# Patient Record
Sex: MALE | Race: BLACK OR AFRICAN AMERICAN | Employment: OTHER | URBAN - METROPOLITAN AREA
[De-identification: names, ages, dates, MRNs, and addresses within clinical notes are randomized per-mention and may not be internally consistent; named-entity substitution may affect disease eponyms.]

---

## 2019-05-23 ENCOUNTER — OFFICE VISIT (OUTPATIENT)
Dept: FAMILY MEDICINE CLINIC | Age: 64
End: 2019-05-23

## 2019-05-23 VITALS
TEMPERATURE: 98.5 F | SYSTOLIC BLOOD PRESSURE: 114 MMHG | HEART RATE: 75 BPM | OXYGEN SATURATION: 99 % | BODY MASS INDEX: 21.73 KG/M2 | HEIGHT: 77 IN | RESPIRATION RATE: 15 BRPM | WEIGHT: 184 LBS | DIASTOLIC BLOOD PRESSURE: 75 MMHG

## 2019-05-23 DIAGNOSIS — R50.9 FEVER AND CHILLS: ICD-10-CM

## 2019-05-23 DIAGNOSIS — J02.9 SORE THROAT: ICD-10-CM

## 2019-05-23 DIAGNOSIS — H61.22 IMPACTED CERUMEN, LEFT EAR: ICD-10-CM

## 2019-05-23 DIAGNOSIS — J00 ACUTE NASOPHARYNGITIS: Primary | ICD-10-CM

## 2019-05-23 LAB
S PYO AG THROAT QL: NEGATIVE
VALID INTERNAL CONTROL?: YES

## 2019-05-23 RX ORDER — BENZONATATE 100 MG/1
100 CAPSULE ORAL
Qty: 21 CAP | Refills: 1 | Status: SHIPPED | OUTPATIENT
Start: 2019-05-23 | End: 2019-05-30

## 2019-05-23 RX ORDER — GUAIFENESIN 100 MG/5ML
81 LIQUID (ML) ORAL DAILY
COMMUNITY

## 2019-05-23 NOTE — LETTER
NOTIFICATION RETURN TO WORK / SCHOOL 
 
5/23/2019 11:04 AM 
 
Mr. Negin Medina 
[id number] [place] [facility] [id number] To Whom It May Concern: 
 
Negin Medina is currently under the care of 1 Amy Collier. He will return to work/school on: 5/26/2018 or next scheduled work day. If there are questions or concerns please have the patient contact our office.  
 
 
 
Sincerely, 
 
 
Taye Ventura MD

## 2019-05-23 NOTE — PATIENT INSTRUCTIONS

## 2019-05-23 NOTE — PROGRESS NOTES
Tiesha Bro May  63 y.o. male  1955  [id number]  [id number]     [facility] MEDICINE: Progress Note       Encounter Date: 5/23/2019    Chief Complaint   Patient presents with    Cold Symptoms     cough, congestion, fever (101 last night), sore throat x3 days       History provided by patient  History of Present Illness   Tiesha Medina is a 61 y.o. male who presents to clinic today for:    Cough  Patient present with cc of cough x 3 days. Symptoms include cough, congestion, sore throat and fever (101F). Difficulty swallowing. He has taken tylenol, advil an mucinex. +sick contacts (son). Denies N/V/D. Health Maintenance  VIIS queried and reviewed; updated in chart as appropriate. Review of Systems   Review of Systems   Constitutional: Positive for chills and fever. HENT: Positive for congestion and sore throat. Negative for ear discharge, ear pain, sinus pain and tinnitus. Respiratory: Positive for cough. Negative for sputum production, shortness of breath and wheezing. Gastrointestinal: Negative for abdominal pain, constipation, diarrhea, nausea and vomiting. Skin: Negative for itching and rash. Neurological: Negative for dizziness and headaches. Vitals/Objective:     Vitals:    05/23/19 1012   BP: 114/75   Pulse: 75   Resp: 15   Temp: 98.5 °F (36.9 °C)   TempSrc: Oral   SpO2: 99%   Weight: 184 lb (83.5 kg)   Height: 6' 4.5\" (1.943 m)     Body mass index is 22.11 kg/m². Wt Readings from Last 3 Encounters:   05/23/19 184 lb (83.5 kg)   12/19/14 199 lb (90.3 kg)       Physical Exam   Constitutional: He is oriented to person, place, and time. He appears well-developed and well-nourished. HENT:   Head: Normocephalic and atraumatic. Right Ear: External ear normal. No drainage, swelling or tenderness. No foreign bodies. Tympanic membrane is not injected, not scarred, not perforated, not erythematous and not retracted. No middle ear effusion. Left Ear: External ear normal. No drainage, swelling or tenderness. A foreign body (cerumen impacted) is present. Tympanic membrane is not injected, not scarred, not perforated, not erythematous and not retracted. Eyes: Conjunctivae are normal. Right eye exhibits no discharge. Left eye exhibits no discharge. Neck: Neck supple. Cardiovascular: Normal rate and regular rhythm. No murmur heard. Pulmonary/Chest: Effort normal and breath sounds normal. No stridor. No respiratory distress. He has no wheezes. Lymphadenopathy:     He has no cervical adenopathy. Neurological: He is alert and oriented to person, place, and time. Recent Results (from the past 24 hour(s))   AMB POC RAPID STREP A    Collection Time: 05/23/19 10:50 AM   Result Value Ref Range    VALID INTERNAL CONTROL POC Yes     Group A Strep Ag Negative Negative     Assessment and Plan:     Encounter Diagnoses     ICD-10-CM ICD-9-CM   1. Acute nasopharyngitis J00 460   2. Impacted cerumen, left ear H61.22 380.4   3. Fever and chills R50.9 780.60   4. Sore throat J02.9 462       1. Acute nasopharyngitis  Likely viral upper respiratory infection. - benzonatate (TESSALON) 100 mg capsule; Take 1 Cap by mouth three (3) times daily as needed for Cough for up to 7 days. Indications: cough  Dispense: 21 Cap; Refill: 1    2. Impacted cerumen, left ear  - REMOVAL IMPACTED CERUMEN IRRIGATION/LVG UNILAT    3. Fever and chills  4. Sore throat  Negative. - AMB POC RAPID STREP A      I have discussed the diagnosis with the patient and the intended plan as seen in the above orders. he has expressed understanding. The patient has received an after-visit summary and questions were answered concerning future plans. I have discussed medication side effects and warnings with the patient as well. Electronically Signed: Fransisco Weaver MD     History/Allergies   Patients past medical, surgical and family histories were reviewed and updated.     Past Medical History:   Diagnosis Date    CAD (coronary artery disease)     stent 2001      Past Surgical History:   Procedure Laterality Date    ABDOMEN SURGERY PROC UNLISTED  1956    inguinal hernia repair    HX HEART CATHETERIZATION  2001    stent    HX HEENT      wisdom teeth extracted    HX TONSILLECTOMY  1963     Family History   Problem Relation Age of Onset    No Known Problems Mother     Cancer Father         throat    Diabetes Father     Alcohol abuse Father     No Known Problems Brother     No Known Problems Brother     No Known Problems Brother      Social History     Tobacco Use    Smoking status: Never Smoker    Smokeless tobacco: Never Used   Substance Use Topics    Alcohol use: Yes     Alcohol/week: 0.5 oz     Types: 1 Glasses of wine per week    Drug use: No          No Known Allergies    Disposition     Follow-up and Dispositions  ·   Return if symptoms worsen or fail to improve. No future appointments. Current Medications after this visit     Current Outpatient Medications   Medication Sig    aspirin 81 mg chewable tablet Take 81 mg by mouth daily.  benzonatate (TESSALON) 100 mg capsule Take 1 Cap by mouth three (3) times daily as needed for Cough for up to 7 days. Indications: cough    simvastatin (ZOCOR) 20 mg tablet Take 20 mg by mouth nightly. No current facility-administered medications for this visit.       Medications Discontinued During This Encounter   Medication Reason    oxyCODONE-acetaminophen (PERCOCET) 5-325 mg per tablet Not A Current Medication    promethazine (PHENERGAN) 25 mg tablet Not A Current Medication    diazepam (VALIUM) 5 mg tablet Not A Current Medication    ranitidine (ZANTAC) 150 mg tablet Not A Current Medication    LACTOBACILLUS ACIDOPHILUS (PROBIOTIC PO) Not A Current Medication

## 2019-05-23 NOTE — PROGRESS NOTES
1. Have you been to the ER, urgent care clinic, or been hospitalized since your last visit? No     2. Have you seen or consulted any other health care providers outside of the [address] [place] [address] since your last visit?  No     Reviewed record in preparation for visit and have necessary documentation  opportunity was given for questions  Goals that were addressed and/or need to be completed during or after this appointment include   Health Maintenance Due   Topic Date Due    Hepatitis C Screening  1955    DTaP/Tdap/Td series (1 - Tdap) 11/07/1976    Shingrix Vaccine Age 50> (1 of 2) 11/07/2005    FOBT Q 1 YEAR AGE 50-75  11/07/2005

## 2019-05-28 ENCOUNTER — TELEPHONE (OUTPATIENT)
Dept: FAMILY MEDICINE CLINIC | Age: 64
End: 2019-05-28

## 2019-05-28 RX ORDER — AMOXICILLIN AND CLAVULANATE POTASSIUM 875; 125 MG/1; MG/1
1 TABLET, FILM COATED ORAL EVERY 12 HOURS
Qty: 14 TAB | Refills: 0 | Status: SHIPPED | OUTPATIENT
Start: 2019-05-28 | End: 2019-06-04

## 2019-05-28 NOTE — TELEPHONE ENCOUNTER
Patient was seen in the office last week and was told to call back if the issues continued. Seeking to pass this on to Dr. Michelle Padilla or his usual PCP Dr. Shay Morillo.

## 2019-06-03 ENCOUNTER — TELEPHONE (OUTPATIENT)
Dept: FAMILY MEDICINE CLINIC | Age: 64
End: 2019-06-03

## 2019-06-03 NOTE — TELEPHONE ENCOUNTER
Attempted to call. No answer. Message left. Spoke with Dr. Livia York. This is a common side effect of the medication. Patient can purchase a probiotic over the counter to help with diarrhea.

## 2019-06-03 NOTE — TELEPHONE ENCOUNTER
Per Dr. Cleveland Edward, ok to stop taking antiobiotic. He can purchase a probiotic OTC and take then for 1-2 weeks. Verbalized understanding.

## 2019-06-03 NOTE — TELEPHONE ENCOUNTER
Pt called stating since he has been on the amoxicillin he has been having severe diarrhea.  Please adise

## 2019-06-07 ENCOUNTER — TELEPHONE (OUTPATIENT)
Dept: FAMILY MEDICINE CLINIC | Age: 64
End: 2019-06-07

## 2019-06-07 NOTE — TELEPHONE ENCOUNTER
Pt called looking for a referral to an ENT Dr. Kayleen Kim stated he has had a sore throat and not improving

## 2019-06-07 NOTE — TELEPHONE ENCOUNTER
Please let the patient know that a sore throat from a cough or sinus infection can last up to 2-3 weeks.  If the sore throat is still present, next week I will submit the referral.     Joyce Dickey MD

## 2020-02-11 PROBLEM — R19.4 CHANGE IN BOWEL HABITS: Status: ACTIVE | Noted: 2020-02-11

## 2020-06-09 DIAGNOSIS — N52.9 ERECTILE DYSFUNCTION, UNSPECIFIED ERECTILE DYSFUNCTION TYPE: Primary | ICD-10-CM

## 2020-06-09 RX ORDER — SILDENAFIL CITRATE 20 MG/1
TABLET ORAL
Qty: 50 TAB | Refills: 4 | Status: SHIPPED | OUTPATIENT
Start: 2020-06-09

## 2021-11-23 ENCOUNTER — TRANSCRIBE ORDER (OUTPATIENT)
Dept: SCHEDULING | Age: 66
End: 2021-11-23

## 2021-11-23 DIAGNOSIS — R10.84 GENERALIZED ABDOMINAL PAIN: Primary | ICD-10-CM

## 2021-11-23 DIAGNOSIS — K59.04 CHRONIC IDIOPATHIC CONSTIPATION: ICD-10-CM

## 2021-11-23 DIAGNOSIS — R15.9 RECTAL LEAKAGE: ICD-10-CM

## 2021-11-23 DIAGNOSIS — K63.89 SMALL INTESTINAL BACTERIAL OVERGROWTH: ICD-10-CM

## 2022-01-04 ENCOUNTER — HOSPITAL ENCOUNTER (OUTPATIENT)
Dept: CT IMAGING | Age: 67
Discharge: HOME | End: 2022-01-04
Attending: INTERNAL MEDICINE
Payer: COMMERCIAL

## 2022-01-04 DIAGNOSIS — R15.9 RECTAL LEAKAGE: ICD-10-CM

## 2022-01-04 DIAGNOSIS — K59.04 CHRONIC IDIOPATHIC CONSTIPATION: ICD-10-CM

## 2022-01-04 DIAGNOSIS — R10.84 GENERALIZED ABDOMINAL PAIN: ICD-10-CM

## 2022-01-04 DIAGNOSIS — K63.89 SMALL INTESTINAL BACTERIAL OVERGROWTH: ICD-10-CM

## 2022-01-04 LAB — CREAT BLD-MCNC: 1.5 MG/DL (ref 0.6–1.3)

## 2022-01-04 PROCEDURE — 74011000636 HC RX REV CODE- 636: Performed by: RADIOLOGY

## 2022-01-04 PROCEDURE — 82565 ASSAY OF CREATININE: CPT

## 2022-01-04 PROCEDURE — 74177 CT ABD & PELVIS W/CONTRAST: CPT

## 2022-01-04 RX ADMIN — IOPAMIDOL 100 ML: 755 INJECTION, SOLUTION INTRAVENOUS at 16:00

## 2022-03-20 PROBLEM — R19.4 CHANGE IN BOWEL HABITS: Status: ACTIVE | Noted: 2020-02-11

## 2023-12-13 ENCOUNTER — HOSPITAL ENCOUNTER (OUTPATIENT)
Facility: HOSPITAL | Age: 68
Discharge: HOME | End: 2023-12-16
Attending: INTERNAL MEDICINE
Payer: OTHER GOVERNMENT

## 2023-12-13 DIAGNOSIS — K76.9 LIVER LESION: ICD-10-CM

## 2023-12-13 PROCEDURE — 76705 ECHO EXAM OF ABDOMEN: CPT

## 2024-02-15 ENCOUNTER — HOSPITAL ENCOUNTER (OUTPATIENT)
Age: 69
Discharge: HOME | End: 2024-02-15
Payer: OTHER GOVERNMENT

## 2024-02-15 DIAGNOSIS — R63.4 WEIGHT LOSS, UNINTENTIONAL: ICD-10-CM

## 2024-02-15 PROCEDURE — 6360000004 HC RX CONTRAST MEDICATION: Performed by: RADIOLOGY

## 2024-02-15 PROCEDURE — 74177 CT ABD & PELVIS W/CONTRAST: CPT

## 2024-02-15 RX ADMIN — IOPAMIDOL 100 ML: 755 INJECTION, SOLUTION INTRAVENOUS at 11:35

## 2024-06-27 ENCOUNTER — OFFICE VISIT (OUTPATIENT)
Age: 69
End: 2024-06-27

## 2024-06-27 VITALS
DIASTOLIC BLOOD PRESSURE: 76 MMHG | TEMPERATURE: 98 F | WEIGHT: 197 LBS | OXYGEN SATURATION: 99 % | HEART RATE: 57 BPM | SYSTOLIC BLOOD PRESSURE: 114 MMHG

## 2024-06-27 DIAGNOSIS — L70.0 OPEN COMEDONE: Primary | ICD-10-CM

## 2024-06-27 NOTE — PROGRESS NOTES
Demar Parker (:  1955) is a 68 y.o. male,New patient, here for evaluation of the following chief complaint(s):  Other (Unknown bump on left armpit  for one year, but its getting worse painful and itchy)      Assessment & Plan :  Visit Diagnoses and Associated Orders       Open comedone    -  Primary    AFL -  Mark Bernardo MD, Dermatology, [place] [facility]) [[id number]]                  Unable to excise comedone in clinic. Patient to follow up with dermatology      Handout given with care instructions  2. OTC for symptom management. Increase fluid intake, ensure adequate nutritional intake.  3. Follow up with PCP as needed.  4. Go to ED with development of any acute symptoms.        Subjective :  Patient reports boil to left side of breast X1 year, [facility] and it was drained and he was started on antibiotics. Over the past few months the area has become painful and tender to the touch. Denies any drainage from area or fevers. On examination area is consistent with an open comedone above hypertrophic scar. No erythema is present.                Vitals:    24 1135   BP: 114/76   Site: Left Upper Arm   Position: Sitting   Cuff Size: Medium Adult   Pulse: 57   Temp: 98 °F (36.7 °C)   TempSrc: Oral   SpO2: 99%   Weight: 89.4 kg (197 lb)       No results found for this visit on 24.      Objective   Physical Exam  Constitutional:       Appearance: Normal appearance.   HENT:      Head: Normocephalic and atraumatic.      Right Ear: Tympanic membrane, ear canal and external ear normal.      Left Ear: Tympanic membrane, ear canal and external ear normal.      Nose: Nose normal.      Mouth/Throat:      Mouth: Mucous membranes are moist.   Eyes:      Extraocular Movements: Extraocular movements intact.      Conjunctiva/sclera: Conjunctivae normal.      Pupils: Pupils are equal, round, and reactive to light.   Cardiovascular:      Rate and Rhythm: Normal rate

## 2024-06-27 NOTE — PATIENT INSTRUCTIONS
Follow up with Dermatology     Cleanse the skin daily with an OTC exfoliating wash.     Take an over-the-counter pain medicine, such as acetaminophen (Tylenol), ibuprofen (Advil, Motrin), or naproxen (Aleve) to reduce fever and relieve body aches. Read and follow all instructions on the label. Increase fluid intake.

## 2025-06-30 ENCOUNTER — OFFICE VISIT (OUTPATIENT)
Age: 70
End: 2025-06-30

## 2025-06-30 VITALS
HEIGHT: 76 IN | DIASTOLIC BLOOD PRESSURE: 60 MMHG | SYSTOLIC BLOOD PRESSURE: 103 MMHG | BODY MASS INDEX: 23.75 KG/M2 | TEMPERATURE: 98.1 F | RESPIRATION RATE: 16 BRPM | WEIGHT: 195 LBS | HEART RATE: 76 BPM | OXYGEN SATURATION: 96 %

## 2025-06-30 DIAGNOSIS — R03.0 ELEVATED BLOOD PRESSURE READING: ICD-10-CM

## 2025-06-30 DIAGNOSIS — J02.9 PHARYNGITIS, UNSPECIFIED ETIOLOGY: Primary | ICD-10-CM

## 2025-06-30 RX ORDER — AZITHROMYCIN 250 MG/1
TABLET, FILM COATED ORAL
Qty: 6 TABLET | Refills: 0 | Status: SHIPPED | OUTPATIENT
Start: 2025-06-30 | End: 2025-07-10

## 2025-06-30 RX ORDER — ASPIRIN 81 MG/1
81 TABLET, CHEWABLE ORAL DAILY
COMMUNITY

## 2025-06-30 ASSESSMENT — ENCOUNTER SYMPTOMS
COUGH: 0
VOMITING: 0
TROUBLE SWALLOWING: 0
RHINORRHEA: 1
ABDOMINAL PAIN: 0
WHEEZING: 0
DIARRHEA: 0
SORE THROAT: 1
SINUS PRESSURE: 0
NAUSEA: 0
SHORTNESS OF BREATH: 0
FACIAL SWELLING: 0
SINUS PAIN: 0
VOICE CHANGE: 0

## 2025-06-30 NOTE — PROGRESS NOTES
2025   Demar Parker (: 1955) is a 69 y.o. male, New patient, here for evaluation of the following chief complaint(s):  Pharyngitis (Pt c.o sore throat pt states throat feel irritated and inflamed  pt denies painful swallowing. Sx onset 4 days. No medication taking. Sx have not subsided.)     ASSESSMENT/PLAN:  Below is the assessment and plan developed based on review of pertinent history, physical exam, labs, studies, and medications.       Assessment & Plan  Pharyngitis, unspecified etiology       Orders:    POCT rapid strep A    azithromycin (ZITHROMAX) 250 MG tablet; 500mg on day 1 followed by 250mg on days 2 - 5    Elevated blood pressure reading              Handout given with care instructions  OTC for symptom management. Increase fluid intake, ensure adequate nutritional intake.  Follow up with PCP as needed.  Go to ED with development of any acute symptoms.     Follow up:  No follow-ups on file.  Follow up immediately for any new, worsening or changes or if symptoms are not improving over the next 5-7 days.     SUBJECTIVE/OBJECTIVE:  Patient reports sore, scratchy, irritated throat for 4 days. Declines covid testing.       History provided by:  Patient   used: No    Pharyngitis  Severity:  Moderate  Onset quality:  Gradual  Duration:  4 days  Timing:  Constant  Progression:  Unchanged  Chronicity:  New  Associated symptoms: rhinorrhea and sore throat    Associated symptoms: no abdominal pain, no chest pain, no congestion, no cough, no diarrhea, no ear pain, no fatigue, no fever, no headaches, no myalgias, no nausea, no rash, no shortness of breath, no vomiting and no wheezing           Pharyngitis (Pt c.o sore throat pt states throat feel irritated and inflamed  pt denies painful swallowing. Sx onset 4 days. No medication taking. Sx have not subsided.)      No results found for any visits on 25.    No results found for this visit on 25.  XR Results (most